# Patient Record
Sex: MALE | Race: WHITE | Employment: UNEMPLOYED | URBAN - METROPOLITAN AREA
[De-identification: names, ages, dates, MRNs, and addresses within clinical notes are randomized per-mention and may not be internally consistent; named-entity substitution may affect disease eponyms.]

---

## 2018-08-21 ENCOUNTER — HOSPITAL ENCOUNTER (EMERGENCY)
Age: 23
Discharge: PSYCHIATRIC OR SUBSTANCE USE FACILITY | End: 2018-08-23
Attending: EMERGENCY MEDICINE
Payer: COMMERCIAL

## 2018-08-21 DIAGNOSIS — R45.851 SUICIDAL IDEATIONS: Primary | ICD-10-CM

## 2018-08-21 DIAGNOSIS — S51.812A FOREARM LACERATION, LEFT, INITIAL ENCOUNTER: ICD-10-CM

## 2018-08-21 LAB
AMPHET UR QL SCN: NEGATIVE
ANION GAP SERPL CALC-SCNC: 7 MMOL/L (ref 3–18)
BARBITURATES UR QL SCN: NEGATIVE
BASOPHILS # BLD: 0 K/UL (ref 0–0.1)
BASOPHILS NFR BLD: 0 % (ref 0–2)
BENZODIAZ UR QL: NEGATIVE
BUN SERPL-MCNC: 12 MG/DL (ref 7–18)
BUN/CREAT SERPL: 13 (ref 12–20)
CALCIUM SERPL-MCNC: 9.3 MG/DL (ref 8.5–10.1)
CANNABINOIDS UR QL SCN: POSITIVE
CHLORIDE SERPL-SCNC: 106 MMOL/L (ref 100–108)
CO2 SERPL-SCNC: 28 MMOL/L (ref 21–32)
COCAINE UR QL SCN: NEGATIVE
CREAT SERPL-MCNC: 0.91 MG/DL (ref 0.6–1.3)
DIFFERENTIAL METHOD BLD: ABNORMAL
EOSINOPHIL # BLD: 0.1 K/UL (ref 0–0.4)
EOSINOPHIL NFR BLD: 1 % (ref 0–5)
ERYTHROCYTE [DISTWIDTH] IN BLOOD BY AUTOMATED COUNT: 13.3 % (ref 11.6–14.5)
ETHANOL SERPL-MCNC: <3 MG/DL (ref 0–3)
GLUCOSE SERPL-MCNC: 87 MG/DL (ref 74–99)
HCT VFR BLD AUTO: 43.7 % (ref 36–48)
HDSCOM,HDSCOM: ABNORMAL
HGB BLD-MCNC: 14.6 G/DL (ref 13–16)
LYMPHOCYTES # BLD: 1.5 K/UL (ref 0.9–3.6)
LYMPHOCYTES NFR BLD: 16 % (ref 21–52)
MCH RBC QN AUTO: 29.9 PG (ref 24–34)
MCHC RBC AUTO-ENTMCNC: 33.4 G/DL (ref 31–37)
MCV RBC AUTO: 89.4 FL (ref 74–97)
METHADONE UR QL: NEGATIVE
MONOCYTES # BLD: 0.6 K/UL (ref 0.05–1.2)
MONOCYTES NFR BLD: 7 % (ref 3–10)
NEUTS SEG # BLD: 6.9 K/UL (ref 1.8–8)
NEUTS SEG NFR BLD: 76 % (ref 40–73)
OPIATES UR QL: NEGATIVE
PCP UR QL: NEGATIVE
PLATELET # BLD AUTO: 191 K/UL (ref 135–420)
PMV BLD AUTO: 8.9 FL (ref 9.2–11.8)
POTASSIUM SERPL-SCNC: 4.1 MMOL/L (ref 3.5–5.5)
RBC # BLD AUTO: 4.89 M/UL (ref 4.7–5.5)
SODIUM SERPL-SCNC: 141 MMOL/L (ref 136–145)
WBC # BLD AUTO: 9.1 K/UL (ref 4.6–13.2)

## 2018-08-21 PROCEDURE — 77030010507 HC ADH SKN DERMBND J&J -B

## 2018-08-21 PROCEDURE — 80307 DRUG TEST PRSMV CHEM ANLYZR: CPT | Performed by: EMERGENCY MEDICINE

## 2018-08-21 PROCEDURE — 75810000293 HC SIMP/SUPERF WND  RPR

## 2018-08-21 PROCEDURE — 74011250637 HC RX REV CODE- 250/637: Performed by: EMERGENCY MEDICINE

## 2018-08-21 PROCEDURE — 99285 EMERGENCY DEPT VISIT HI MDM: CPT

## 2018-08-21 PROCEDURE — 85025 COMPLETE CBC W/AUTO DIFF WBC: CPT | Performed by: EMERGENCY MEDICINE

## 2018-08-21 PROCEDURE — 80048 BASIC METABOLIC PNL TOTAL CA: CPT | Performed by: EMERGENCY MEDICINE

## 2018-08-21 RX ORDER — IBUPROFEN 200 MG
1 TABLET ORAL DAILY
Status: DISCONTINUED | OUTPATIENT
Start: 2018-08-21 | End: 2018-08-23 | Stop reason: HOSPADM

## 2018-08-21 RX ORDER — LORAZEPAM 1 MG/1
2 TABLET ORAL
Status: COMPLETED | OUTPATIENT
Start: 2018-08-21 | End: 2018-08-21

## 2018-08-21 RX ADMIN — LORAZEPAM 2 MG: 1 TABLET ORAL at 21:50

## 2018-08-21 NOTE — ED NOTES
Verbal shift change report given to Little Montano (oncoming nurse) by Mil Urbina (offgoing nurse).  Report included the following information SBAR, ED Summary and MAR

## 2018-08-21 NOTE — ED TRIAGE NOTES
Pt arrives via EMS with multiple lacerations to left wrist.  Pt cut himself with a knife, however states it was an accident. However patient accompanied by police and patient states \"I will stay voluntary for help\". Pt reports one suicide attempt in the past with a hospitalization.   Pt states \"what thye diagnosed me with was crap and I never took the medicine\"

## 2018-08-21 NOTE — ED PROVIDER NOTES
[facility] EMERGENCY DEPT      5:47 PM    Date: 8/21/2018  Patient Name: Pham Piña    History of Presenting Illness     Chief Complaint   Patient presents with    Suicidal    Laceration     left wrist       History Provided By: Patient    Chief Complaint: Self-injury  Duration:  1 Hour  Timing:  Sudden  Location: Left wrist  Quality: \"Left wrist knife cuts 5x\"  Severity: Mild  Associated Symptoms: Patient denies any other associated symptoms or complaints. 24 y.o. male with noted past medical history who presents to the emergency department c/o self-injury onset 1 hour ago. Patient states that he accidentally cut himself with a knife. He describes his self injury as an \"accident,\" \"not intentional,\" 5 cuts, superficial, and mild. He states, \"I will stay voluntarily for help. \" Patient reports once suicide attempt in the past with a hospitalization. Patient states, \"What they diagnosed me with was crap and I never took the medicine. \" He smokes 1 pack a day and does not drink alcohol. Patient denies any other associated symptoms or complaints. No other complaints. Nursing notes regarding the HPI and triage nursing notes were reviewed. Prior medical records were reviewed. Past History     Past Medical History:  No past medical history on file. Past Surgical History:  No past surgical history on file. Family History:  No family history on file. Social History:  Social History   Substance Use Topics    Smoking status: Not on file    Smokeless tobacco: Not on file    Alcohol use Not on file       Allergies:  No Known Allergies    Patient's primary care provider (as noted in EPIC):  None    Review of Systems   Constitutional: Negative for diaphoresis. HENT: Negative for congestion. Eyes: Negative for discharge. Respiratory: Negative for stridor. Cardiovascular: Negative for palpitations. Gastrointestinal: Negative for diarrhea.    Genitourinary: Negative for flank pain.   Musculoskeletal: Negative for back pain. Neurological: Negative for weakness. Psychiatric/Behavioral: Positive for self-injury (left wrist knife cuts 5x). Negative for hallucinations. All other systems reviewed and are negative. Visit Vitals    /72 (BP 1 Location: Right arm, BP Patient Position: At rest)    Pulse 78    Resp 16    Ht 5' 5\" (1.651 m)    Wt 61.2 kg (135 lb)    SpO2 99%    BMI 22.47 kg/m2       PHYSICAL EXAM:    CONSTITUTIONAL:  Alert, in no apparent distress;  well developed;  well nourished. HEAD:  Normocephalic, atraumatic. EYES:  EOMI. Non-icteric sclera. Normal conjunctiva. ENTM:  Nose:  no rhinorrhea. Throat:  no erythema or exudate, mucous membranes moist.  NECK:  No JVD. Supple  RESPIRATORY:  Chest clear, equal breath sounds, good air movement. CARDIOVASCULAR:  Regular rate and rhythm. No murmurs, rubs, or gallops. GI:  Normal bowel sounds, abdomen soft and non-tender. No rebound or guarding. BACK:  Non-tender. UPPER EXT:  Normal inspection. LOWER EXT:  No edema, no calf tenderness. Distal pulses intact. NEURO:  Moves all four extremities, and grossly normal motor exam.  SKIN:  No rashes;  Normal for age. PSYCH:  Alert and normal affect. DIFFERENTIAL DIAGNOSES/ MEDICAL DECISION MAKING:   Differential diagnoses/impression: Need to rule out obvious organic causes versus psychological etiology. Based on patient's presentation and lab work, I do not believe that there is an obvious organic etiology for the patient's suicidal ideation. I believe the patient needs psychiatric evaluation and treatment for the suicidal ideation.       ED COURSE:      Diagnostic Study Results     Abnormal lab results from this emergency department encounter:  Labs Reviewed   CBC WITH AUTOMATED DIFF - Abnormal; Notable for the following:        Result Value    MPV 8.9 (*)     NEUTROPHILS 76 (*)     LYMPHOCYTES 16 (*)     All other components within normal limits METABOLIC PANEL, BASIC   ETHYL ALCOHOL   DRUG SCREEN, URINE       Lab values for this patient within approximately the last 12 hours:  Recent Results (from the past 12 hour(s))   CBC WITH AUTOMATED DIFF    Collection Time: 08/21/18  5:45 PM   Result Value Ref Range    WBC 9.1 4.6 - 13.2 K/uL    RBC 4.89 4.70 - 5.50 M/uL    HGB 14.6 13.0 - 16.0 g/dL    HCT 43.7 36.0 - 48.0 %    MCV 89.4 74.0 - 97.0 FL    MCH 29.9 24.0 - 34.0 PG    MCHC 33.4 31.0 - 37.0 g/dL    RDW 13.3 11.6 - 14.5 %    PLATELET 318 994 - 238 K/uL    MPV 8.9 (L) 9.2 - 11.8 FL    NEUTROPHILS 76 (H) 40 - 73 %    LYMPHOCYTES 16 (L) 21 - 52 %    MONOCYTES 7 3 - 10 %    EOSINOPHILS 1 0 - 5 %    BASOPHILS 0 0 - 2 %    ABS. NEUTROPHILS 6.9 1.8 - 8.0 K/UL    ABS. LYMPHOCYTES 1.5 0.9 - 3.6 K/UL    ABS. MONOCYTES 0.6 0.05 - 1.2 K/UL    ABS. EOSINOPHILS 0.1 0.0 - 0.4 K/UL    ABS. BASOPHILS 0.0 0.0 - 0.1 K/UL    DF AUTOMATED         Radiologist and cardiologist interpretations if available at time of this note:  No results found. Medication(s) ordered for patient during this emergency visit encounter:  Medications - No data to display    Medical Decision Making     I am the first provider for this patient. I reviewed the vital signs, available nursing notes, past medical history, past surgical history, family history and social history. Vital Signs:  Reviewed the patient's vital signs. ED COURSE:  The patient has no active medical issues. I believe that the patient is medically cleared for admission to a psychiatric unit if this is deemed appropriate by the crisis staff after their evaluation of the patient. PROCEDURE NOTE:    DERMABOND REPAIR OF LACERATION    Laceration Repair Site:  Left distal forearm, multiple superficial lacerations. Local anesthetic:  None  Laceration length:  5 cm. The area was prepped with Betadine solution.   The laceration was cleaned with wound cleanser and no debris was removed with wound scrubbing and/or wound irrigation. The noted laceration(s) was/were repaired with Dermabond. Patient tolerated the procedure well. IMPRESSION AND MEDICAL DECISION MAKING:  Based upon the patient's presentation with noted HPI and PE, along with the work up done in the emergency department, I believe that the patient is having suicidal ideation that MAY require admission and further evaluation on a psychiatric/ behavioral medicine unit. THE PATIENT IS MEDICALLY CLEARED FOR ADMISSION TO A PSYCHIATRIC UNIT. Condition:  Stable    Signout Note      Pt care transferred to Dr. Cecilia Coulter  ,ED provider. History of patient complaint(s), available diagnostic reports and current treatment plan has been discussed thoroughly. Bedside rounding on patient occured : no . Intended disposition of patient : TBD  Pending diagnostics reports and/or labs (please list): Serum labs, UDS, and telepsychiatry recommendations (telepsychiatry already consulted but has not made initial call back). Coding Diagnoses     Clinical Impression:   1. Suicidal ideations    2. Self-inflicted injury    3. Forearm laceration, left, initial encounter          Brian L. Clifm Libman, M.D. KAMI Board Certified Emergency Physician    Provider Attestation:  If a scribe was utilized in generation of this patient record, I personally performed the services described in the documentation, reviewed the documentation, as recorded by the scribe in my presence, and it accurately records the patient's history of presenting illness, review of systems, patient physical examination, and procedures performed by me as the attending physician. Virgel Ross L. Clifm Libman, M.D. ABEM Board Certified Emergency Physician  8/21/2018.  5:47 PM    Scribe Attestation     Randy Norman acting as a scribe for and in the presence of Deejay Lala MD      August 21, 2018 at 6:06 PM       Provider Attestation:      I personally performed the services described in the documentation, reviewed the documentation, as recorded by the scribe in my presence, and it accurately and completely records my words and actions.  August 21, 2018 at 6:06 PM - Rhett Hobbs MD

## 2018-08-22 LAB
APPEARANCE UR: CLEAR
BILIRUB UR QL: NEGATIVE
COLOR UR: YELLOW
GLUCOSE UR STRIP.AUTO-MCNC: NEGATIVE MG/DL
HGB UR QL STRIP: NEGATIVE
KETONES UR QL STRIP.AUTO: NEGATIVE MG/DL
LEUKOCYTE ESTERASE UR QL STRIP.AUTO: NEGATIVE
NITRITE UR QL STRIP.AUTO: NEGATIVE
PH UR STRIP: 8 [PH] (ref 5–8)
PROT UR STRIP-MCNC: NEGATIVE MG/DL
SP GR UR REFRACTOMETRY: 1.02 (ref 1–1.03)
UROBILINOGEN UR QL STRIP.AUTO: 1 EU/DL (ref 0.2–1)

## 2018-08-22 PROCEDURE — 74011250637 HC RX REV CODE- 250/637: Performed by: EMERGENCY MEDICINE

## 2018-08-22 PROCEDURE — 81003 URINALYSIS AUTO W/O SCOPE: CPT | Performed by: EMERGENCY MEDICINE

## 2018-08-22 RX ORDER — HYDROXYZINE PAMOATE 25 MG/1
25 CAPSULE ORAL
Status: DISCONTINUED | OUTPATIENT
Start: 2018-08-22 | End: 2018-08-22

## 2018-08-22 RX ORDER — HYDROXYZINE PAMOATE 25 MG/1
25 CAPSULE ORAL
Status: DISCONTINUED | OUTPATIENT
Start: 2018-08-22 | End: 2018-08-23 | Stop reason: HOSPADM

## 2018-08-22 RX ADMIN — HYDROXYZINE PAMOATE 25 MG: 25 CAPSULE ORAL at 23:57

## 2018-08-22 RX ADMIN — HYDROXYZINE PAMOATE 25 MG: 25 CAPSULE ORAL at 17:03

## 2018-08-22 NOTE — ED NOTES
Verbal shift change report given to Beto (oncoming nurse) by Bucky Petit (offgoing nurse). Report included the following information SBAR, ED Summary and MAR.

## 2018-08-22 NOTE — PROGRESS NOTES
Spoke with [facility]  access line they have no male beds available in the system at this time. Spoke with [facility], they have no beds at this time, but may have discharges later , will fax information for review.

## 2018-08-22 NOTE — PROGRESS NOTES
Jillian Bales will review information. 1129 am spoke with Jillian Bales and they have not been able to  review the patient information yet.

## 2018-08-22 NOTE — PROGRESS NOTES
Spoke with [facility] and he is not appropriate for crisis stabilization at this time, however the patient's father  Per charge Nurse  Nikhil Tinajero has gone to the  to obtain and [employer]. If police come [facility] will have to be called to evaluate patient.

## 2018-08-22 NOTE — ED NOTES
Vitals:  Patient Vitals for the past 12 hrs:   Temp Pulse Resp BP SpO2   08/21/18 2216 98.3 °F (36.8 °C) 68 16 106/67 96 %   08/21/18 1703 - 78 16 118/72 99 %         Medications ordered:   Medications   nicotine (NICODERM CQ) 21 mg/24 hr patch 1 Patch (1 Patch TransDERmal Apply Patch 8/21/18 2151)   LORazepam (ATIVAN) tablet 2 mg (2 mg Oral Given 8/21/18 2150)         Lab findings:  Recent Results (from the past 12 hour(s))   DRUG SCREEN, URINE    Collection Time: 08/21/18  5:05 PM   Result Value Ref Range    BENZODIAZEPINES NEGATIVE  NEG      BARBITURATES NEGATIVE  NEG      THC (TH-CANNABINOL) POSITIVE (A) NEG      OPIATES NEGATIVE  NEG      PCP(PHENCYCLIDINE) NEGATIVE  NEG      COCAINE NEGATIVE  NEG      AMPHETAMINES NEGATIVE  NEG      METHADONE NEGATIVE  NEG      HDSCOM (NOTE)    METABOLIC PANEL, BASIC    Collection Time: 08/21/18  5:45 PM   Result Value Ref Range    Sodium 141 136 - 145 mmol/L    Potassium 4.1 3.5 - 5.5 mmol/L    Chloride 106 100 - 108 mmol/L    CO2 28 21 - 32 mmol/L    Anion gap 7 3.0 - 18 mmol/L    Glucose 87 74 - 99 mg/dL    BUN 12 7.0 - 18 MG/DL    Creatinine 0.91 0.6 - 1.3 MG/DL    BUN/Creatinine ratio 13 12 - 20      GFR est AA >60 >60 ml/min/1.73m2    GFR est non-AA >60 >60 ml/min/1.73m2    Calcium 9.3 8.5 - 10.1 MG/DL   CBC WITH AUTOMATED DIFF    Collection Time: 08/21/18  5:45 PM   Result Value Ref Range    WBC 9.1 4.6 - 13.2 K/uL    RBC 4.89 4.70 - 5.50 M/uL    HGB 14.6 13.0 - 16.0 g/dL    HCT 43.7 36.0 - 48.0 %    MCV 89.4 74.0 - 97.0 FL    MCH 29.9 24.0 - 34.0 PG    MCHC 33.4 31.0 - 37.0 g/dL    RDW 13.3 11.6 - 14.5 %    PLATELET 927 296 - 517 K/uL    MPV 8.9 (L) 9.2 - 11.8 FL    NEUTROPHILS 76 (H) 40 - 73 %    LYMPHOCYTES 16 (L) 21 - 52 %    MONOCYTES 7 3 - 10 %    EOSINOPHILS 1 0 - 5 %    BASOPHILS 0 0 - 2 %    ABS. NEUTROPHILS 6.9 1.8 - 8.0 K/UL    ABS. LYMPHOCYTES 1.5 0.9 - 3.6 K/UL    ABS. MONOCYTES 0.6 0.05 - 1.2 K/UL    ABS. EOSINOPHILS 0.1 0.0 - 0.4 K/UL    ABS.  BASOPHILS 0.0 0.0 - 0.1 K/UL    DF AUTOMATED     ETHYL ALCOHOL    Collection Time: 08/21/18  5:45 PM   Result Value Ref Range    ALCOHOL(ETHYL),SERUM <3 0 - 3 MG/DL       EKG interpretation by ED Physician:      X-Ray, CT or other radiology findings or impressions:  No orders to display       Progress notes, Consult notes or additional Procedure notes:   T/o from dr Teri Jeff to f/u telepsych consult which was reviewed by me  Pt given ativan, nicotine patch to assist with his anxiety, agitation  Still voluntary. Will t/o dr Rolanda Victoria at approx 6am to f/u placement    Reevaluation of patient:   stable    Disposition:  Diagnosis:   1. Suicidal ideations    2. Self-inflicted injury    3.  Forearm laceration, left, initial encounter        Disposition: pending placement    Follow-up Information     None            Patient's Medications    No medications on file

## 2018-08-22 NOTE — ED NOTES
Vitals:  Patient Vitals for the past 12 hrs:   Temp Pulse Resp BP SpO2   08/22/18 1601 97.5 °F (36.4 °C) 68 16 103/65 96 %   08/22/18 0758 98.2 °F (36.8 °C) 69 16 104/66 98 %       Medications ordered:   Medications   nicotine (NICODERM CQ) 21 mg/24 hr patch 1 Patch (1 Patch TransDERmal Apply Patch 8/21/18 2151)   hydrOXYzine pamoate (VISTARIL) capsule 25 mg (25 mg Oral Given 8/22/18 1703)   LORazepam (ATIVAN) tablet 2 mg (2 mg Oral Given 8/21/18 2150)         Lab findings:  No results found for this or any previous visit (from the past 12 hour(s)). EKG interpretation by ED Physician:      X-Ray, CT or other radiology findings or impressions:  No orders to display       Progress notes, Consult notes or additional Procedure notes:   T/o from dr Mague Guo pending placement  Pt accepted at [facility]    Reevaluation of patient:   stable    Disposition:  Diagnosis:   1. Suicidal ideations    2. Self-inflicted injury    3.  Forearm laceration, left, initial encounter        Disposition: transfer    Follow-up Information     None            Patient's Medications    No medications on file

## 2018-08-22 NOTE — ED NOTES
6:00 :Pt care assumed from Dr. Shameka Nair, ED provider. Pt complaint(s), current treatment plan, progression and available diagnostic results have been discussed thoroughly. Intended Disposition: Transfer   Pending diagnostic reports and/or labs (please list): Pending placement    16:35 Pt with anxiety requesting some medication I am putting Hydroxyzine PRN. 1800 : Pt care transferred to Dr. Shameka Nair, ED provider. History of patient complaint(s), available diagnostic reports and current treatment plan has been discussed thoroughly. Bedside rounding on patient occured : N/A . Intended disposition of patient : Transfer  Pending diagnostics reports and/or labs (please list): Pending placement    Scribe Attestation     Franciscoedu Murrell acting as a scribe for and in the presence of Loren Cain MD      August 22, 2018 at 4:40 PM       Provider Attestation:      I personally performed the services described in the documentation, reviewed the documentation, as recorded by the scribe in my presence, and it accurately and completely records my words and actions.  August 22, 2018 at 4:40 PM - Loren Cain MD

## 2018-08-22 NOTE — CONSULTS
Name: Lea Lo                                                                        : 1995  Date: 2018                                                                             Time: 9p et                 Location of patient: [facility]                                              Location of doctor: [facility], [address] [place] [address]    This evaluation was conducted via telepsychiatry with the assistance of onsite staff     Chief Complaint: \"paranoid\"     History of Present Illness:   Pt is a 23 yo WM with unclear psych hx (possible psychosis/paranoia) presented to ED by police with self harm by cutting wrists. Pt was seen and evalauted. Pt admits to cutting himself in wrists, but kept saying it was just an accident, but says he did it intentional, pt was vague about story and was guarded in answering. Pt says he just had a bad day, was upset, but unable to give reasons. Says he wakes up everyday hoping things get better, says he argues with God. Pt says he was diagnosed with \"paranoia\" before, \"they tried to give me meds\" but says he doesn't think anything works. Says he doesn't have schizophrenia. Reports his dad has \"paranoia\" too. Pt denies current SI, denies AVH, denies any mood sx's but says sometimes he gets sad and cries a lot.  Pt was ok with getting help but only wanted to be in hospital for 1-2 days, then got agitated and wanted to smoke a cigarette.        SI/ Self harm: admits to self harm on wrists  HI/Violence: denies  Access to weapons: denies  Legal: denies  Psychiatric History/Treatment History: says he was diagnosed with paranoia, and he might have had schizophrenia, says he tried meds but doesn't think they work, reports he was admitted in 2017 in [address] [place] [address]    Drug/Alcohol History: denies    Medical History: denies  Medications & Freq: denies    Allergies: per chart  Family Psych History/History of suicide: says dad has \"paranoia\" too  Social History: lives with dad, no kids, single                    Employment: unemployed, on SSI about $500/mo                    Education: graduated 12th                      Mental Status Exam:   Appearance and attire: laying in bed  Attitude and behavior: calm at first, then irritated and paranoid  Speech: rrr  Mood/Affect: ok/constricted  Thought processes: disorganized at times  Thought content: +paranoia, no si/hi  Perception: no avh  Cognition: grossly intact  Insight and judgment: poor     Diagnosis:   Unspecified psychosis, r/o depression, r/o mood disorder     Treatment Recommendations:    -recommend inpatient psych admission, pt admits to self harm with cutting wrists, pt has paranoia and somewhat disorganized, pt voluntary at this time, but became defensive when discussing inpatient treatment  -can you Haldol and/or Ativan PRN for severe agitation if needed in ED, will defer starting meds/antipsychotics to inpatient   -pt will need outpatient f/u on discharge, he says he has therapist already

## 2018-08-22 NOTE — ED NOTES
Patient pacing back and fourth to restroom. Per sitter at bedside the patient is excessively masturbating.

## 2018-08-22 NOTE — PROGRESS NOTES
Police officers here with patient and [facility] has been notified.       Per [facility] patient remains voluntary and will not TDO

## 2018-08-23 VITALS
BODY MASS INDEX: 22.49 KG/M2 | HEART RATE: 75 BPM | TEMPERATURE: 98 F | WEIGHT: 135 LBS | OXYGEN SATURATION: 96 % | DIASTOLIC BLOOD PRESSURE: 64 MMHG | HEIGHT: 65 IN | SYSTOLIC BLOOD PRESSURE: 109 MMHG | RESPIRATION RATE: 16 BRPM

## 2018-08-23 PROCEDURE — 74011250637 HC RX REV CODE- 250/637: Performed by: EMERGENCY MEDICINE

## 2018-08-23 RX ADMIN — HYDROXYZINE PAMOATE 25 MG: 25 CAPSULE ORAL at 09:52

## 2018-08-23 NOTE — ANCILLARY DISCHARGE INSTRUCTIONS
Call made to [address] for an update, spoke with Darrion Woo, patient is still in line for review. Will call main ED with decision.

## 2018-08-23 NOTE — ED NOTES
0600 :Pt care assumed from Dr. Diallo [facility], ED provider. Pt complaint(s), current treatment plan, progression and available diagnostic results have been discussed thoroughly. Rounding occurred: N/A  Intended Disposition: Transfer   Pending diagnostic reports and/or labs (please list): Pending transfer      [address] [place] [address] acting as a scribe for and in the presence of Sylvia Yoo MD      August 23, 2018 at 8:05 AM       Provider Attestation:      I personally performed the services described in the documentation, reviewed the documentation, as recorded by the scribe in my presence, and it accurately and completely records my words and actions.  August 23, 2018 at 8:05 AM - Sylvia Yoo MD

## 2018-08-23 NOTE — ANCILLARY DISCHARGE INSTRUCTIONS
Call made to [address] [place] [address], spoke with Juan Villalobos, they are at capacity.

## 2018-08-23 NOTE — ANCILLARY DISCHARGE INSTRUCTIONS
Call made to [facility], spoke with ED, they are working on [facility] patients, try tomorrow.

## 2018-08-23 NOTE — ROUTINE PROCESS
TRANSFER - OUT REPORT:    Verbal report given to Brielle Watt(name) on Delaney Quinn  being transferred to [facility](unit) for routine progression of care       Report consisted of patients Situation, Background, Assessment and   Recommendations(SBAR). Information from the following report(s) SBAR, ED Summary and MAR was reviewed with the receiving nurse. Lines:       Opportunity for questions and clarification was provided.       Patient awaiting transport

## 2018-08-23 NOTE — ED NOTES
Pt transported BLS via [facility] [address] [place] [address] to [facility].  Belongings returned

## 2018-08-23 NOTE — ED NOTES
Patient accepted at [facility], [address] [facility]. Accepting physician is Dr. Cruzito Andre. Report called to accepting unit. [phone].

## 2018-08-23 NOTE — ED NOTES
Multiple medical transport provider called. Unable to provide transport until the morning. Receiving unit called and updated on situation.